# Patient Record
Sex: FEMALE | Race: WHITE | NOT HISPANIC OR LATINO | Employment: UNEMPLOYED | URBAN - NONMETROPOLITAN AREA
[De-identification: names, ages, dates, MRNs, and addresses within clinical notes are randomized per-mention and may not be internally consistent; named-entity substitution may affect disease eponyms.]

---

## 2017-02-28 RX ORDER — PROPRANOLOL HYDROCHLORIDE 20 MG/1
TABLET ORAL
Qty: 60 TABLET | Refills: 0 | OUTPATIENT
Start: 2017-02-28

## 2017-03-22 ENCOUNTER — OFFICE VISIT (OUTPATIENT)
Dept: CARDIOLOGY | Facility: CLINIC | Age: 52
End: 2017-03-22

## 2017-03-22 VITALS
HEIGHT: 62 IN | WEIGHT: 293 LBS | OXYGEN SATURATION: 96 % | SYSTOLIC BLOOD PRESSURE: 145 MMHG | HEART RATE: 69 BPM | DIASTOLIC BLOOD PRESSURE: 92 MMHG | BODY MASS INDEX: 53.92 KG/M2

## 2017-03-22 DIAGNOSIS — E66.01 MORBID OBESITY DUE TO EXCESS CALORIES (HCC): ICD-10-CM

## 2017-03-22 DIAGNOSIS — E88.81 METABOLIC SYNDROME: ICD-10-CM

## 2017-03-22 DIAGNOSIS — I49.5 SSS (SICK SINUS SYNDROME) (HCC): ICD-10-CM

## 2017-03-22 DIAGNOSIS — E78.00 HYPERCHOLESTEREMIA: ICD-10-CM

## 2017-03-22 DIAGNOSIS — I10 ESSENTIAL HYPERTENSION: Primary | ICD-10-CM

## 2017-03-22 DIAGNOSIS — Z79.899 MEDICATION MANAGEMENT: ICD-10-CM

## 2017-03-22 DIAGNOSIS — Z95.0 CARDIAC PACEMAKER IN SITU: ICD-10-CM

## 2017-03-22 PROCEDURE — 99214 OFFICE O/P EST MOD 30 MIN: CPT | Performed by: NURSE PRACTITIONER

## 2017-03-22 RX ORDER — VALSARTAN AND HYDROCHLOROTHIAZIDE 80; 12.5 MG/1; MG/1
1 TABLET, FILM COATED ORAL DAILY
Qty: 30 TABLET | Refills: 6 | Status: SHIPPED | OUTPATIENT
Start: 2017-03-22

## 2017-03-22 NOTE — PROGRESS NOTES
Chief Complaint   Patient presents with   • Follow-up     Has esophegial spasm, denies chest pain or palpitations. PCP refills medications.       Subjective       Qian Harley is a 51 y.o. female with a history of hypertension, diabetes, and hypercholesterolemia who has type II AV block and underwent permanent pacemaker placement in 2012 by Dr. Hernandez. At her last office visit the dose of propanolol was increased to help prevent headaches. Her [employer] pacemaker interrogation in October 2016 showed normal function and no changes were made. Two brief mode switches less than a minute noted. Today she comes to the office for a follow up appointment reports recently in hospital due to allergic reaction to Zomig. From a cardiac standpoint she denies symptoms. She continues to have shortness of breath with minimal exertion, mostly due to weight. She has occasional mild headaches. At night she wakens often, but otherwise feels she sleeps okay.     HPI         Cardiac History:    Past Surgical History:   Procedure Laterality Date   • CARDIOVASCULAR STRESS TEST  06/27/2013    L. Myoview-(LCR) Negative   • CONVERTED (HISTORICAL) HOLTER  08/16/2012    AVG HR 83 bpm. Type 2 AV block   • ECHO - CONVERTED  08/16/2012    EF 65%   • ECHO - CONVERTED  06/27/2013    (LCR) EF 65%   • PACEMAKER IMPLANTATION  08/24/2012    PPM- [employer] by Dr. Hernandez       Current Outpatient Prescriptions   Medication Sig Dispense Refill   • albuterol (PROVENTIL HFA;VENTOLIN HFA) 108 (90 BASE) MCG/ACT inhaler Inhale 2 puffs every 4 (four) hours as needed for wheezing.     • aspirin 81 MG EC tablet Take 81 mg by mouth daily.     • Cholecalciferol (VITAMIN D3) 1000 UNITS capsule Take  by mouth daily.     • dicyclomine (BENTYL) 20 MG tablet Take 20 mg by mouth 3 (three) times a day.     • EPINEPHrine (EPIPEN IJ) Inject  as directed as needed.     • esomeprazole (NexIUM) 40 MG capsule Take 40 mg by mouth every morning before breakfast.     •  fenofibrate (TRICOR) 145 MG tablet Take 145 mg by mouth daily.     • FLUTICASONE PROPIONATE, NASAL, NA into each nostril daily.     • ibuprofen (ADVIL,MOTRIN) 800 MG tablet Take 800 mg by mouth every 8 (eight) hours.     • loratadine (CLARITIN) 10 MG tablet Take 10 mg by mouth daily.     • medroxyPROGESTERone (PROVERA) 10 MG tablet Take 10 mg by mouth 2 (two) times a day as needed.     • metFORMIN (GLUCOPHAGE) 500 MG tablet Take 500 mg by mouth 2 (two) times a day with meals.     • nitroglycerin (NITROSTAT) 0.4 MG SL tablet 1 under the tongue as needed for angina, may repeat q5mins for up three doses 25 tablet 1   • omeprazole (PriLOSEC) 40 MG capsule Take 40 mg by mouth daily.     • oxybutynin (DITROPAN) 5 MG tablet Take 5 mg by mouth daily.     • propranolol (INDERAL) 40 MG tablet Decrease to 1/2 in morning and 1/2 at night     • tiZANidine (ZANAFLEX) 4 MG tablet Take 4 mg by mouth 3 (three) times a day as needed for muscle spasms.     • gabapentin (NEURONTIN) 400 MG capsule Take 400 mg by mouth 3 (three) times a day.     • valsartan-hydrochlorothiazide (DIOVAN HCT) 80-12.5 MG per tablet Take 1 tablet by mouth Daily. 30 tablet 6     No current facility-administered medications for this visit.        Meropenem; Codeine; Hydrocodone; Imitrex [sumatriptan]; Keflex [cephalexin]; Ketek [telithromycin]; Lortab [hydrocodone-acetaminophen]; Mucinex [guaifenesin er]; Nyquil multi-symptom [pseudoeph-doxylamine-dm-apap]; Other; Penicillins; Percocet [oxycodone-acetaminophen]; Verapamil; Vicks formula 44 cough-cold pm [dm-apap-cpm]; and Zomig [zolmitriptan]    Past Medical History:   Diagnosis Date   • Abdominal cramps    • Abnormal blood sugar    • Acute bronchitis    • Acute carpal tunnel syndrome    • Acute pharyngitis    • Acute recurrent frontal sinusitis    • Anxiety, generalized    • Aphthous ulcer    • Asthma    • Back ache    • Back injuries     two lower back injuries- followed by dr Romero.   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   • H/O oral surgery     [place] teeth extracted   • Headache     Migraine    • History of bladder surgery     x3 as child   • History of cholecystectomy    • Hx of tonsillectomy    • Hyperlipidemia    • Hypertension    • Hypokalemia    • Insomnia    • Irritable bowel    • Menopausal symptoms    • Nausea    • Osteoarthritis    • Overweight    • Peptic ulcer disease    • Polycystic ovaries     takes metformin for this   • Sebaceous cyst    • Sleep apnea     testing at sleep center- did use cpap- returned.   • Stress bladder incontinence, female    • Thyroid nodule    • TMJ (dislocation of temporomandibular joint)    • Torticollis    • Vaginitis    • Vitamin B 12 deficiency    • Vitamin B12 deficient megaloblastic anemia    • Wenckebach's phenomenon        Social History     Social History   • Marital status:      Spouse name: N/A   • Number of children: N/A   • Years of education: N/A     Occupational History   • Not on file.     Social History Main Topics   • Smoking status: Former Smoker     Quit date: 11/2011   • Smokeless tobacco: Never Used   • Alcohol use No      Comment: seldom   • Drug use: No   • Sexual activity: Not on file     Other Topics Concern   • Not on file     Social History Narrative       Family History   Problem Relation Age of Onset   • Heart murmur Mother    • Heart attack Father      stents   • Heart murmur Sister        Review of Systems   Constitutional: Positive for fatigue. Negative for activity change, appetite change and fever.   HENT: Negative.    Respiratory: Positive for cough, choking (sometimes has choking sensation related to esophageal spasms) and shortness of breath. Negative for wheezing.    Cardiovascular: Positive for leg swelling. Negative for chest pain and  "palpitations.   Gastrointestinal: Negative.    Musculoskeletal: Positive for arthralgias, gait problem (ambulates with cane) and myalgias.   Neurological: Negative for dizziness, facial asymmetry, speech difficulty and light-headedness.   Hematological: Does not bruise/bleed easily.   Psychiatric/Behavioral: Positive for sleep disturbance. Negative for confusion.       Diabetes- Yes  Thyroid-normal    Objective     /92 (BP Location: Left arm, Patient Position: Sitting, Cuff Size: Large Adult)  Pulse 69  Ht 62\" (157.5 cm)  Wt (!) 322 lb 4 oz (146 kg)  SpO2 96%  BMI 58.94 kg/m2    Physical Exam   Constitutional: She is oriented to person, place, and time.   Eyes: Pupils are equal, round, and reactive to light.   Neck: Neck supple. Carotid bruit is not present.   Cardiovascular: Normal rate, regular rhythm and S1 normal.    Murmur heard.   Systolic murmur is present with a grade of 2/6   Pulses:       Radial pulses are 3+ on the right side, and 3+ on the left side.   Slightly loud S2   Abdominal: Soft. Bowel sounds are normal. She exhibits no distension. There is no tenderness.   Musculoskeletal: She exhibits no edema.   Neurological: She is alert and oriented to person, place, and time.   Skin: Skin is warm and dry.   Psychiatric: She has a normal mood and affect. Her behavior is normal. Judgment and thought content normal.   Vitals reviewed.        ECG 12 Lead  Date/Time: 3/23/2017 10:33 AM  Performed by: SALLIE ROYAL  Authorized by: SALLIE ROYAL   Comparison: compared with previous ECG from 9/12/2016  Comparison to previous ECG: sinus  Rhythm: paced  BPM: 64  Comments: Sinus with ventricular pacing                Assessment/Plan      Qian was seen today for follow-up.    Diagnoses and all orders for this visit:    Essential hypertension    Hypercholesteremia    Morbid obesity due to excess calories    Metabolic syndrome    SSS (sick sinus syndrome)  -     ECG 12 Lead    Medication " management    Cardiac pacemaker in situ  -     ECG 12 Lead    Other orders  -     valsartan-hydrochlorothiazide (DIOVAN HCT) 80-12.5 MG per tablet; Take 1 tablet by mouth Daily.        She states she still plans to move to [place], but not certain exactly when. We will go ahead and schedule a 6 month follow up. A pacemaker interrogation made for next available. Regarding allergic reactions and propanolol, we will try decreasing the dose and increase the dose of ARB/HCT for blood pressure management. She has not been able to tolerate CCB in the past due to significant respiratory reaction. She states without propanolol her heart races and she develops significant headaches. After discussing with pharmacist it was not determined that a different beta blocker would necessarily be a better choice for her.   Due to history of abnormal sleep study, I advised her to reconsider repeat study and treatment of sleep apnea if warranted due to associated cardiac risk. She understands need for weight loss. She follows with PCP for management of labs.            Electronically signed by LILLIAM Bedolla,  March 23, 2017 10:40 AM

## 2017-03-23 PROBLEM — E78.00 HYPERCHOLESTEREMIA: Status: ACTIVE | Noted: 2017-03-23

## 2017-03-23 PROBLEM — E88.81 METABOLIC SYNDROME: Status: ACTIVE | Noted: 2017-03-23

## 2017-03-23 PROBLEM — E66.01 MORBID OBESITY DUE TO EXCESS CALORIES (HCC): Status: ACTIVE | Noted: 2017-03-23

## 2017-03-23 PROBLEM — I10 ESSENTIAL HYPERTENSION: Status: ACTIVE | Noted: 2017-03-23

## 2017-03-23 PROBLEM — I49.5 SSS (SICK SINUS SYNDROME) (HCC): Status: ACTIVE | Noted: 2017-03-23

## 2017-03-23 PROCEDURE — 93000 ELECTROCARDIOGRAM COMPLETE: CPT | Performed by: NURSE PRACTITIONER

## 2017-04-26 ENCOUNTER — OFFICE VISIT (OUTPATIENT)
Dept: CARDIOLOGY | Facility: CLINIC | Age: 52
End: 2017-04-26

## 2017-04-26 DIAGNOSIS — I49.5 SSS (SICK SINUS SYNDROME) (HCC): Primary | ICD-10-CM

## 2017-04-26 DIAGNOSIS — E88.81 METABOLIC SYNDROME: ICD-10-CM

## 2017-04-26 PROCEDURE — 93288 INTERROG EVL PM/LDLS PM IP: CPT | Performed by: INTERNAL MEDICINE
